# Patient Record
Sex: FEMALE | Race: WHITE | NOT HISPANIC OR LATINO | Employment: UNEMPLOYED | URBAN - METROPOLITAN AREA
[De-identification: names, ages, dates, MRNs, and addresses within clinical notes are randomized per-mention and may not be internally consistent; named-entity substitution may affect disease eponyms.]

---

## 2020-12-29 ENCOUNTER — OFFICE VISIT (OUTPATIENT)
Dept: PODIATRY | Facility: CLINIC | Age: 65
End: 2020-12-29
Payer: MEDICARE

## 2020-12-29 VITALS
SYSTOLIC BLOOD PRESSURE: 173 MMHG | HEART RATE: 101 BPM | HEIGHT: 57 IN | BODY MASS INDEX: 27.4 KG/M2 | WEIGHT: 127 LBS | DIASTOLIC BLOOD PRESSURE: 88 MMHG

## 2020-12-29 DIAGNOSIS — M06.372 RHEUMATOID NODULE, LEFT ANKLE AND FOOT: ICD-10-CM

## 2020-12-29 DIAGNOSIS — M06.371 RHEUMATOID NODULE, RIGHT ANKLE AND FOOT: Primary | ICD-10-CM

## 2020-12-29 PROBLEM — M06.30 RHEUMATOID NODULES: Status: ACTIVE | Noted: 2020-12-29

## 2020-12-29 PROBLEM — M06.9 RA (RHEUMATOID ARTHRITIS): Status: ACTIVE | Noted: 2020-12-29

## 2020-12-29 PROCEDURE — 1101F PR PT FALLS ASSESS DOC 0-1 FALLS W/OUT INJ PAST YR: ICD-10-PCS | Mod: CPTII,S$GLB,, | Performed by: PODIATRIST

## 2020-12-29 PROCEDURE — 99203 PR OFFICE/OUTPT VISIT, NEW, LEVL III, 30-44 MIN: ICD-10-PCS | Mod: S$GLB,,, | Performed by: PODIATRIST

## 2020-12-29 PROCEDURE — 3008F PR BODY MASS INDEX (BMI) DOCUMENTED: ICD-10-PCS | Mod: CPTII,S$GLB,, | Performed by: PODIATRIST

## 2020-12-29 PROCEDURE — 99999 PR PBB SHADOW E&M-NEW PATIENT-LVL II: CPT | Mod: PBBFAC,,, | Performed by: PODIATRIST

## 2020-12-29 PROCEDURE — 99203 OFFICE O/P NEW LOW 30 MIN: CPT | Mod: S$GLB,,, | Performed by: PODIATRIST

## 2020-12-29 PROCEDURE — 99999 PR PBB SHADOW E&M-NEW PATIENT-LVL II: ICD-10-PCS | Mod: PBBFAC,,, | Performed by: PODIATRIST

## 2020-12-29 PROCEDURE — 3288F PR FALLS RISK ASSESSMENT DOCUMENTED: ICD-10-PCS | Mod: CPTII,S$GLB,, | Performed by: PODIATRIST

## 2020-12-29 PROCEDURE — 1101F PT FALLS ASSESS-DOCD LE1/YR: CPT | Mod: CPTII,S$GLB,, | Performed by: PODIATRIST

## 2020-12-29 PROCEDURE — 3288F FALL RISK ASSESSMENT DOCD: CPT | Mod: CPTII,S$GLB,, | Performed by: PODIATRIST

## 2020-12-29 PROCEDURE — 1159F MED LIST DOCD IN RCRD: CPT | Mod: S$GLB,,, | Performed by: PODIATRIST

## 2020-12-29 PROCEDURE — 3008F BODY MASS INDEX DOCD: CPT | Mod: CPTII,S$GLB,, | Performed by: PODIATRIST

## 2020-12-29 PROCEDURE — 1159F PR MEDICATION LIST DOCUMENTED IN MEDICAL RECORD: ICD-10-PCS | Mod: S$GLB,,, | Performed by: PODIATRIST

## 2020-12-29 RX ORDER — LEFLUNOMIDE 20 MG/1
20 TABLET ORAL EVERY OTHER DAY
COMMUNITY

## 2023-01-13 NOTE — PROGRESS NOTES
Subjective:     Patient ID: Brigitte Gonsalez is a 65 y.o. female.    Chief Complaint: Foot Pain (pt c/o pain / burning of cyst on right side of foot, non diabetic pt wears tennis shoes w/ socks, PCP Dr. Uribe last seen unknown)    Brigitte is a 65 y.o. female who presents to the podiatry clinic complaining of painful right cyst. Onset of the symptoms was several years ago. Precipitating event: none known. Current symptoms include: ability to bear weight, but with some pain. Aggravating factors: walking. Symptoms have been intermittent. Patient has had no prior foot problems. Evaluation to date: none. Treatment to date: none. Patients rates pain 3/10 on pain scale. Patient complains of burning pain that sometimes shoots across the ball of right foot. Patient admits she has RA and sees a rheumatologist. Patient has no other pedal complaints at this time.     Patient Active Problem List   Diagnosis    RA (rheumatoid arthritis)    Rheumatoid nodules       Medication List with Changes/Refills   Current Medications    LEFLUNOMIDE (ARAVA) 20 MG TAB    Take 20 mg by mouth every other day.       Review of patient's allergies indicates:  No Known Allergies    History reviewed. No pertinent surgical history.    Family History   Problem Relation Age of Onset    Heart disease Mother     Heart disease Father        Social History     Socioeconomic History    Marital status:      Spouse name: Not on file    Number of children: Not on file    Years of education: Not on file    Highest education level: Not on file   Occupational History    Not on file   Social Needs    Financial resource strain: Not on file    Food insecurity     Worry: Not on file     Inability: Not on file    Transportation needs     Medical: Not on file     Non-medical: Not on file   Tobacco Use    Smoking status: Never Smoker    Smokeless tobacco: Never Used   Substance and Sexual Activity    Alcohol use: Never     Frequency: Never    Drug  "use: Never    Sexual activity: Not on file   Lifestyle    Physical activity     Days per week: Not on file     Minutes per session: Not on file    Stress: Not on file   Relationships    Social connections     Talks on phone: Not on file     Gets together: Not on file     Attends Restorationist service: Not on file     Active member of club or organization: Not on file     Attends meetings of clubs or organizations: Not on file     Relationship status: Not on file   Other Topics Concern    Not on file   Social History Narrative    Not on file       Vitals:    12/29/20 1057   BP: (!) 173/88   Pulse: 101   Weight: 57.6 kg (127 lb)   Height: 4' 9" (1.448 m)         Review of Systems   Constitutional: Negative for chills and fever.   Respiratory: Negative for shortness of breath.    Cardiovascular: Negative for chest pain, palpitations, orthopnea, claudication and leg swelling.   Gastrointestinal: Negative for diarrhea, nausea and vomiting.   Musculoskeletal: Negative for joint pain.   Skin: Negative for rash.   Neurological: Negative for dizziness, tingling, sensory change, focal weakness and weakness.   Psychiatric/Behavioral: Negative.              Objective:       PHYSICAL EXAM: Apperance: Alert and orient in no distress,well developed, and with good attention to grooming and body habits  Patient presents ambulating in flats  Lower Extremity Physical Exam:  VASCULAR: Dorsalis pedis pulses 2/4 bilateral and Posterior Tibial pulses 2/4 bilateral. Capillary fill time <3 seconds bilateral. No edema observed bilateral. Varicosities absent bilateral. Skin temperature of the lower extremities is warm to warm, proximal to distal. Hair growth WNL bilateral.  DERMATOLOGICAL: No skin rashes, lesions, or ulcers observed bilateral. Bilateral subcutaneous nodules noted to lateral 5th metatarsal heads.   NEUROLOGICAL: Light touch, sharp-dull, proprioception all present and equal bilaterally.    MUSCULOSKELETAL: Muscle strength is " 5/5 for foot inverters, everters, plantarflexors, and dorsiflexors. Muscle tone is normal. (+) tenderness on palpation of right foot subcutaneous nodule.       Assessment:       Encounter Diagnoses   Name Primary?    Rheumatoid nodule, right ankle and foot Yes    Rheumatoid nodule, left ankle and foot          Plan:   Rheumatoid nodule, right ankle and foot    Rheumatoid nodule, left ankle and foot      I counseled the patient on her conditions, regarding findings of my examination, my impressions, and usual treatment plan.   Treatment options for were discussed with the patient.  The patient was told that she can have no treatment, conservative treatment or surgical treatment.  The patient was informed that the only way to resolve the structural deformity is via surgery.  The patient was instructed that surgical option is reserved for patients with painful deformity and that if she did not have pain that she should not elect the surgical option.  Discussed surgical and conservative management of rheumatoid nodule deformity. Conservatively we did discuss padding, and shoe modifications such as softer shoes with wide toe boxes. Surgically we briefly discussed pre and post operative expectations. The patient elects for conservative management at this time.   Dispensed tube foams to be worn in shoes daily.   Patient to return as needed.             Teresa Victoria DPM  [facility]        Azelaic Acid Counseling: Patient counseled that medicine may cause skin irritation and to avoid applying near the eyes.  In the event of skin irritation, the patient was advised to reduce the amount of the drug applied or use it less frequently.   The patient verbalized understanding of the proper use and possible adverse effects of azelaic acid.  All of the patient's questions and concerns were addressed.